# Patient Record
Sex: FEMALE | Race: WHITE
[De-identification: names, ages, dates, MRNs, and addresses within clinical notes are randomized per-mention and may not be internally consistent; named-entity substitution may affect disease eponyms.]

---

## 2018-07-26 ENCOUNTER — HOSPITAL ENCOUNTER (INPATIENT)
Dept: HOSPITAL 92 - L&D/OP | Age: 38
LOS: 1 days | Discharge: HOME | End: 2018-07-27
Attending: OBSTETRICS & GYNECOLOGY | Admitting: OBSTETRICS & GYNECOLOGY
Payer: SELF-PAY

## 2018-07-26 VITALS — BODY MASS INDEX: 27.2 KG/M2

## 2018-07-26 DIAGNOSIS — Z3A.40: ICD-10-CM

## 2018-07-26 LAB
BASOPHILS # BLD AUTO: 0 THOU/UL (ref 0–0.2)
BASOPHILS NFR BLD AUTO: 0.1 % (ref 0–1)
EOSINOPHIL # BLD AUTO: 0.1 THOU/UL (ref 0–0.7)
EOSINOPHIL NFR BLD AUTO: 1.3 % (ref 0–10)
HBSAG INDEX: 0.23 S/CO (ref 0–0.99)
HGB BLD-MCNC: 13.7 G/DL (ref 12–16)
LYMPHOCYTES # BLD: 1.4 THOU/UL (ref 1.2–3.4)
LYMPHOCYTES NFR BLD AUTO: 12.1 % (ref 21–51)
MCH RBC QN AUTO: 29.9 PG (ref 27–31)
MCV RBC AUTO: 86 FL (ref 78–98)
MONOCYTES # BLD AUTO: 0.5 THOU/UL (ref 0.11–0.59)
MONOCYTES NFR BLD AUTO: 4.8 % (ref 0–10)
NEUTROPHILS # BLD AUTO: 9.2 THOU/UL (ref 1.4–6.5)
NEUTROPHILS NFR BLD AUTO: 81.8 % (ref 42–75)
PLATELET # BLD AUTO: 112 THOU/UL (ref 130–400)
RBC # BLD AUTO: 4.58 MILL/UL (ref 4.2–5.4)
SYPHILIS ANTIBODY INDEX: 0.05 S/CO
WBC # BLD AUTO: 11.6 THOU/UL (ref 4.8–10.8)

## 2018-07-26 PROCEDURE — 87340 HEPATITIS B SURFACE AG IA: CPT

## 2018-07-26 PROCEDURE — 86780 TREPONEMA PALLIDUM: CPT

## 2018-07-26 PROCEDURE — 85027 COMPLETE CBC AUTOMATED: CPT

## 2018-07-26 PROCEDURE — 86880 COOMBS TEST DIRECT: CPT

## 2018-07-26 PROCEDURE — 86901 BLOOD TYPING SEROLOGIC RH(D): CPT

## 2018-07-26 PROCEDURE — 86850 RBC ANTIBODY SCREEN: CPT

## 2018-07-26 PROCEDURE — 86905 BLOOD TYPING RBC ANTIGENS: CPT

## 2018-07-26 PROCEDURE — 86922 COMPATIBILITY TEST ANTIGLOB: CPT

## 2018-07-26 PROCEDURE — 99285 EMERGENCY DEPT VISIT HI MDM: CPT

## 2018-07-26 PROCEDURE — 86900 BLOOD TYPING SEROLOGIC ABO: CPT

## 2018-07-26 PROCEDURE — 86870 RBC ANTIBODY IDENTIFICATION: CPT

## 2018-07-26 RX ADMIN — DOCUSATE CALCIUM SCH MG: 240 CAPSULE, LIQUID FILLED ORAL at 21:28

## 2018-07-26 NOTE — OP
Ms. Strange delivered a male infant at 10:30 a.m. on 07/26/2018.  Delivery was complicated by nuchal co
rd x1.  Placenta delivered spontaneously followed by Pitocin infusion.  

Estimated blood loss 200 mL.  Quantitative blood loss is pending.  Dr. Coleman is the delivering [facility].  There were no lacerations.  Mother and baby are stable in the immediate postpartum in the [facility]

## 2018-07-26 NOTE — PDOC.EVN
Event Note





- Event Note


Event Note: 





Called by RD on postpartum. Patient s/p motrin but requesting additional pain 

meds. Ordered norco 5/325 prn

## 2018-07-26 NOTE — HP
DATE OF SERVICE:  07/26/2018.

 

TIME OF EVALUATION:  At bedside at 12:20.

 

LOCATION:  Labor and Delivery, bed 7.

 

REASON FOR EVALUATION:  This is a patient who was transferred by [facility] for fetal heart tones, which were found during the labor 
process by auscultation.  The [facility] staff is with the patient as is the 
family.

 

HISTORY OF PRESENT ILLNESS:  In brief, the patient is a 37-year-old G4, P3 with 
3 prior vaginal births, who at around 11:50 had a cervical exam at [facility] that 
was 8 cm prior to transport.  That was the last check at [facility].  She was 
transferred here because they heard decelerations with contraction.

 

ALLERGIES:  She has no allergies.

 

PAST SURGICAL HISTORY:  None.

 

OB HISTORY:  Significant for 3 prior vaginal births.

 

PHYSICAL EXAMINATION:

VITAL SIGNS:  Stable and she is afebrile.  She is not hypertensive.

GENERAL:  She is in no acute distress, but has contraction discomfort.

ABDOMEN:  Gravid with an EFW of about 7 pounds.  Cervical exam by me at roughly 
1230 was 8 cm/90 percent effaced/-1, cephalic presentation.  Moderate meconium 
was noted.  The [facility] nursing staff said that she underwent spontaneous 
rupture of membranes at about 10:30 and by their verbal report, the fluid was 
clear.

 

MONITORS: Fetal heart tones are in the 130s with moderate variability.  It is 
important to note that at the moment of transcription of this H&P and of the 
handwritten note which is also in the physical chart, only about 3-4 minutes of 
fetal strip was visible as I intercepted the patient right as she was being 
admitted.  Contractions on tocodynamometer are about every 2-3 minutes.

 

ASSESSMENT and PLAN:  This is a 37-year-old G4, P3 at 40 weeks gestation and 6 
days, who was brought from the [facility] in the active phase of 
labor for fetal decelerations on auscultation.  I evaluated the patient as she 
was being admitted and found her to be 8 cm.  Currently, I only have about 6 
minutes of strip and I find that the contractions are regular about every 2-3 
minutes with moderate variability and some variable decelerations.  One of the 
decelerations actually looked late appearing.  Currently, IV fluids are going 
in place and she is in her lateral decubitus position.  I have discussed with 
the family, and the [facility] staff, and the patient, right now we are in 
observation mode as I only have less than 10 minutes of fetal tracing.  If the 
fetal heart tones are persistently nonreassuring, we may consider primary C-
section.  I have also asked for anesthesia to see the patient, so that they are 
familiar with her.  We also discussed with the family the presence of meconium 
and at 40 weeks can be a physiological finding.  However, in the presence of 
decelerations, it does increase the risk of meconium aspiration syndrome.  I 
also discussed with them that amnioinfusion is not recommended as it may 
increase the risk of meconium aspiration syndrome.  For now, she is 8 cm with 
decelerations on a limited Doppler tracing.  I will continue to get at least a 
20-30 minute strip and then make further decisions based on those findings at 
that time.  I also told the family that Dr. Coleman, my partner, will relieve 
me at 1300 until 1600 at which time he will be on the unit.

 

ROBERT

## 2018-07-27 VITALS — TEMPERATURE: 98 F | SYSTOLIC BLOOD PRESSURE: 111 MMHG | DIASTOLIC BLOOD PRESSURE: 60 MMHG

## 2018-07-27 LAB
HGB BLD-MCNC: 10.8 G/DL (ref 12–16)
MCH RBC QN AUTO: 30.2 PG (ref 27–31)
MCV RBC AUTO: 87.6 FL (ref 78–98)
PLATELET # BLD AUTO: 91 THOU/UL (ref 130–400)
RBC # BLD AUTO: 3.56 MILL/UL (ref 4.2–5.4)
WBC # BLD AUTO: 10.8 THOU/UL (ref 4.8–10.8)

## 2018-07-27 RX ADMIN — DOCUSATE CALCIUM SCH MG: 240 CAPSULE, LIQUID FILLED ORAL at 11:06

## 2018-07-27 NOTE — PDOC.PP
Post Partum Progress Note


Post Partum Day #: 1


Subjective: 





Doing well


PO intake tolerated: yes


Flatus: yes


Ambulation: yes


 Vital Signs (12 hours)











  Temp Pulse Resp BP BP Pulse Ox


 


 18 04:08  97.8 F  59 L  18  103/59 L  


 


 18 00:08  98.0 F  74  16  98/50 L  


 


 18 19:42  98.2 F  73  18   


 


 18 19:40  98.2 F  73  18   107/57 L  98


 


 18 17:20  99.1 F  67  20   


 


 18 17:05  99.1 F  67  20   106/61 








 Weight











Weight                         174 lb

















- Physical Examination


General: NAD


Cardiovascular: no m/r/g


Respiratory: clear to auscultation bilaterally


Abdominal: + bowel sounds, lochia, no distention, appropriately TTP


Extremities: negative homans (B)


Neurological: no gross focal deficits


Psychiatric: A&Ox3, normal affect


Result Diagrams: 


 18 12:40





Additional Labs: 


 Post Partum Labs











Blood Type  A POSITIVE   18  12:29    


 


Hep Bs Antigen  Non-Reactive S/CO (NonReactive)   18  12:29    











(1) Vaginal delivery


Code(s): O80 - ENCOUNTER FOR FULL-TERM UNCOMPLICATED DELIVERY   Status: Acute   





- Assessment/Plan





Patient s/p  by Dr Coleman yesterday at 1420 or so. She is doing well and 

would like to be discharged home this PM after 24 hours postpartum. Motrin 

written as RX by me. Cleared for afternoon discharge later today.Baby 

well...birth weight was 4200grams.

## 2018-07-27 NOTE — PDOC.EVN
Event Note





- Event Note


Event Note: 


Discharge note





Admit: 18


Discharge: 18





Procedure: 


                Multigravida





In brief, patient arrived from [facility] in labor, for decels. 

She progressed to  shortly after her arrival to L&D. Apgars were 8 and 9. 

Baby weight was 4200grams (approx).





Home on postpartum day 1.